# Patient Record
Sex: MALE | HISPANIC OR LATINO | URBAN - METROPOLITAN AREA
[De-identification: names, ages, dates, MRNs, and addresses within clinical notes are randomized per-mention and may not be internally consistent; named-entity substitution may affect disease eponyms.]

---

## 2024-04-01 ENCOUNTER — HOSPITAL ENCOUNTER (EMERGENCY)
Facility: HOSPITAL | Age: 34
Discharge: HOME | End: 2024-04-02
Attending: EMERGENCY MEDICINE

## 2024-04-01 DIAGNOSIS — S06.0X1A CONCUSSION WITH LOSS OF CONSCIOUSNESS OF 30 MINUTES OR LESS, INITIAL ENCOUNTER: Primary | ICD-10-CM

## 2024-04-01 DIAGNOSIS — S16.1XXA ACUTE STRAIN OF NECK MUSCLE, INITIAL ENCOUNTER: ICD-10-CM

## 2024-04-01 DIAGNOSIS — S80.02XA CONTUSION OF LEFT KNEE, INITIAL ENCOUNTER: ICD-10-CM

## 2024-04-01 DIAGNOSIS — S00.83XA CONTUSION OF FACE, INITIAL ENCOUNTER: ICD-10-CM
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
## 2024-04-01 DIAGNOSIS — R52 PAIN: ICD-10-CM

## 2024-04-01 PROCEDURE — 96374 THER/PROPH/DIAG INJ IV PUSH: CPT

## 2024-04-01 PROCEDURE — 99285 EMERGENCY DEPT VISIT HI MDM: CPT | Mod: 25

## 2024-04-01 PROCEDURE — 96375 TX/PRO/DX INJ NEW DRUG ADDON: CPT

## 2024-04-01 RX ORDER — MORPHINE SULFATE 4 MG/ML
4 INJECTION, SOLUTION INTRAMUSCULAR; INTRAVENOUS
Status: COMPLETED | OUTPATIENT
Start: 2024-04-01 | End: 2024-04-02

## 2024-04-01 RX ORDER — ONDANSETRON HYDROCHLORIDE 2 MG/ML
4 INJECTION, SOLUTION INTRAVENOUS
Status: COMPLETED | OUTPATIENT
Start: 2024-04-01 | End: 2024-04-02

## 2024-04-01 NOTE — Clinical Note
"Rodriguez Liavery Norris was seen and treated in our emergency department on 4/1/2024.  He may return to work on 04/06/2024.       If you have any questions or concerns, please don't hesitate to call.      Shauna Gonzalez, DO"

## 2024-04-02 VITALS
TEMPERATURE: 98 F | SYSTOLIC BLOOD PRESSURE: 110 MMHG | DIASTOLIC BLOOD PRESSURE: 67 MMHG | WEIGHT: 140 LBS | OXYGEN SATURATION: 100 % | HEART RATE: 54 BPM | RESPIRATION RATE: 15 BRPM

## 2024-04-02 LAB
ALBUMIN SERPL BCP-MCNC: 4.3 G/DL (ref 3.5–5.2)
ALP SERPL-CCNC: 84 U/L (ref 55–135)
ALT SERPL W/O P-5'-P-CCNC: 26 U/L (ref 10–44)
ANION GAP SERPL CALC-SCNC: 12 MMOL/L (ref 8–16)
AST SERPL-CCNC: 41 U/L (ref 10–40)
BASOPHILS # BLD AUTO: 0.04 K/UL (ref 0–0.2)
BASOPHILS NFR BLD: 0.4 % (ref 0–1.9)
BILIRUB SERPL-MCNC: 0.4 MG/DL (ref 0.1–1)
BUN SERPL-MCNC: 25 MG/DL (ref 6–20)
CALCIUM SERPL-MCNC: 9.4 MG/DL (ref 8.7–10.5)
CHLORIDE SERPL-SCNC: 101 MMOL/L (ref 95–110)
CO2 SERPL-SCNC: 21 MMOL/L (ref 23–29)
CREAT SERPL-MCNC: 1.1 MG/DL (ref 0.5–1.4)
DIFFERENTIAL METHOD BLD: ABNORMAL
EOSINOPHIL # BLD AUTO: 0.1 K/UL (ref 0–0.5)
EOSINOPHIL NFR BLD: 0.5 % (ref 0–8)
ERYTHROCYTE [DISTWIDTH] IN BLOOD BY AUTOMATED COUNT: 11.9 % (ref 11.5–14.5)
EST. GFR  (NO RACE VARIABLE): >60 ML/MIN/1.73 M^2
GLUCOSE SERPL-MCNC: 88 MG/DL (ref 70–110)
HCT VFR BLD AUTO: 42.4 % (ref 40–54)
HCV AB SERPL QL IA: NORMAL
HGB BLD-MCNC: 15.3 G/DL (ref 14–18)
HIV 1+2 AB+HIV1 P24 AG SERPL QL IA: NORMAL
IMM GRANULOCYTES # BLD AUTO: 0.04 K/UL (ref 0–0.04)
IMM GRANULOCYTES NFR BLD AUTO: 0.4 % (ref 0–0.5)
LYMPHOCYTES # BLD AUTO: 2.2 K/UL (ref 1–4.8)
LYMPHOCYTES NFR BLD: 20 % (ref 18–48)
MCH RBC QN AUTO: 31.5 PG (ref 27–31)
MCHC RBC AUTO-ENTMCNC: 36.1 G/DL (ref 32–36)
MCV RBC AUTO: 87 FL (ref 82–98)
MONOCYTES # BLD AUTO: 0.6 K/UL (ref 0.3–1)
MONOCYTES NFR BLD: 5.7 % (ref 4–15)
NEUTROPHILS # BLD AUTO: 8.2 K/UL (ref 1.8–7.7)
NEUTROPHILS NFR BLD: 73 % (ref 38–73)
NRBC BLD-RTO: 0 /100 WBC
OHS QRS DURATION: 86 MS
OHS QTC CALCULATION: 440 MS
PLATELET # BLD AUTO: 215 K/UL (ref 150–450)
PMV BLD AUTO: 11.3 FL (ref 9.2–12.9)
POTASSIUM SERPL-SCNC: 3.3 MMOL/L (ref 3.5–5.1)
PROT SERPL-MCNC: 7.9 G/DL (ref 6–8.4)
RBC # BLD AUTO: 4.85 M/UL (ref 4.6–6.2)
SODIUM SERPL-SCNC: 134 MMOL/L (ref 136–145)
WBC # BLD AUTO: 11.19 K/UL (ref 3.9–12.7)

## 2024-04-02 PROCEDURE — 25000003 PHARM REV CODE 250

## 2024-04-02 PROCEDURE — 80053 COMPREHEN METABOLIC PANEL: CPT | Performed by: EMERGENCY MEDICINE

## 2024-04-02 PROCEDURE — 25500020 PHARM REV CODE 255: Performed by: EMERGENCY MEDICINE

## 2024-04-02 PROCEDURE — 87389 HIV-1 AG W/HIV-1&-2 AB AG IA: CPT | Performed by: PHYSICIAN ASSISTANT

## 2024-04-02 PROCEDURE — 86803 HEPATITIS C AB TEST: CPT | Performed by: PHYSICIAN ASSISTANT

## 2024-04-02 PROCEDURE — 63600175 PHARM REV CODE 636 W HCPCS

## 2024-04-02 PROCEDURE — 85025 COMPLETE CBC W/AUTO DIFF WBC: CPT | Performed by: EMERGENCY MEDICINE

## 2024-04-02 RX ORDER — DIPHENHYDRAMINE HCL 25 MG
25 CAPSULE ORAL
Status: COMPLETED | OUTPATIENT
Start: 2024-04-02 | End: 2024-04-02

## 2024-04-02 RX ORDER — HYDROCODONE BITARTRATE AND ACETAMINOPHEN 5; 325 MG/1; MG/1
1 TABLET ORAL EVERY 6 HOURS PRN
Qty: 12 TABLET | Refills: 0 | Status: SHIPPED | OUTPATIENT
Start: 2024-04-02 | End: 2024-04-05

## 2024-04-02 RX ADMIN — ONDANSETRON 4 MG: 2 INJECTION INTRAMUSCULAR; INTRAVENOUS at 12:04

## 2024-04-02 RX ADMIN — IOHEXOL 75 ML: 350 INJECTION, SOLUTION INTRAVENOUS at 02:04

## 2024-04-02 RX ADMIN — MORPHINE SULFATE 4 MG: 4 INJECTION INTRAVENOUS at 12:04

## 2024-04-02 RX ADMIN — DIPHENHYDRAMINE HYDROCHLORIDE 25 MG: 25 CAPSULE ORAL at 03:04

## 2024-04-02 NOTE — ED TRIAGE NOTES
Patient reports that he fell off of the roof of a 2 story house. States that he hit the front of her face, states that he is having all over body pain from fall. Reports that he lost consciousness for a moment. Denies any blood thinners.

## 2024-04-02 NOTE — ED TRIAGE NOTES
"Rodriguez Juve NievesKevinAraujo, a 33 y.o. male presents to the ED w/ complaint of fall. Reports falling off of a roof, +LOC, bleeding from nose. Ambulatory    Triage note:  Chief Complaint   Patient presents with    Fall     On a roof and fell off 2 story house at 8am. +LOC. C/o "whole body" pain and headache. Reports was bleeding from nose. Ambulatory in triage     Review of patient's allergies indicates:  No Known Allergies  History reviewed. No pertinent past medical history.    "

## 2024-04-02 NOTE — ED PROVIDER NOTES
"Encounter Date: 4/1/2024       History     Chief Complaint   Patient presents with    Fall     On a roof and fell off 2 story house at 8am. +LOC. C/o "whole body" pain and headache. Reports was bleeding from nose. Ambulatory in triage     HPI    Patient is a 34yo male with no pmhx presenting after a 2 story fall from roof occurring at around 2000 this evening. Patient was on his way down the ladder/stairwell when he slipped. LOC for around 10 minutes. Notes epistaxis, frontal bone pain, and LLE pain. Was able to ambulate prior to arrival.      used.    Review of patient's allergies indicates:  No Known Allergies  History reviewed. No pertinent past medical history.  History reviewed. No pertinent surgical history.  History reviewed. No pertinent family history.     Physical Exam     Initial Vitals [04/01/24 2220]   BP Pulse Resp Temp SpO2   133/79 63 15 97.7 °F (36.5 °C) 97 %      MAP       --         Physical Exam    Constitutional: He appears well-developed and well-nourished. He is not diaphoretic.   HENT:   Head: Normocephalic.   Mouth/Throat: Oropharynx is clear and moist.   Eyes: Conjunctivae are normal.   Neck:   Midline cspine tenderess   Cardiovascular:  Normal rate and regular rhythm.           Pulmonary/Chest: Breath sounds normal. No respiratory distress.   Abdominal: Abdomen is soft. He exhibits no distension. There is no abdominal tenderness.   Musculoskeletal:         General: Tenderness (BL shoulders and L proximal lower leg pain) present. No edema. Normal range of motion.      Comments: Thoracic spine midline tendnerness     Neurological: He is alert. He has normal strength. No cranial nerve deficit or sensory deficit.   Skin: Skin is warm and dry.         ED Course   Procedures  Labs Reviewed   CBC W/ AUTO DIFFERENTIAL - Abnormal; Notable for the following components:       Result Value    MCH 31.5 (*)     MCHC 36.1 (*)     Gran # (ANC) 8.2 (*)     All other components within " normal limits   COMPREHENSIVE METABOLIC PANEL - Abnormal; Notable for the following components:    Sodium 134 (*)     Potassium 3.3 (*)     CO2 21 (*)     BUN 25 (*)     AST 41 (*)     All other components within normal limits   HIV 1 / 2 ANTIBODY    Narrative:     Release to patient->Immediate   HEPATITIS C ANTIBODY    Narrative:     Release to patient->Immediate     EKG Readings: (Independently Interpreted)   Initial Reading: No STEMI. Rhythm: Normal Sinus Rhythm. Heart Rate: 73. Ectopy: No Ectopy. Conduction: Normal. ST Segments: Normal ST Segments. T Waves Flipped: AVR and V1. Axis: Normal. Clinical Impression: Normal Sinus Rhythm       Imaging Results              CT Chest Abdomen Pelvis With IV Contrast (XPD) NO Oral Contrast (Final result)  Result time 04/02/24 02:27:42      Final result by Akshat Montaño MD (04/02/24 02:27:42)                   Impression:      No acute abnormality identified.    Motion and artifact limited study.    Other findings discussed in the body of the report.      Electronically signed by: Akshat Montaño MD  Date:    04/02/2024  Time:    02:27               Narrative:    EXAMINATION:  CT CHEST ABDOMEN PELVIS WITH IV CONTRAST (XPD)    CLINICAL HISTORY:  Polytrauma, blunt;    TECHNIQUE:  Low dose axial images, sagittal and coronal reformations were obtained from the thoracic inlet to the pubic symphysis following the IV administration of 75 mL of Omnipaque 350 .  Oral contrast was not given.    COMPARISON:  None.    FINDINGS:  Evaluation is limited by extensive streak artifact due to the patient's left arm overlying the field of view.  Exam quality also limited by motion.    Chest:    Base of the neck is negative for acute finding.    Thoracic aorta is normal in course and caliber without evidence of aneurysm or dissection.  No central pulmonary embolus.  Heart size is normal.  No pericardial effusion.    Minimal patchy lucency throughout the lungs, potentially related to mild  pulmonary emphysema.  Detailed evaluation of the pulmonary parenchyma limited by extensive respiratory motion.  No consolidation.  No pleural fluid.  No distinct pneumothorax.    No bulky mediastinal lymphadenopathy.    Abdomen:    Liver is unremarkable allowing for artifact limitations.  Gallbladder is unremarkable.  No intrahepatic biliary ductal dilatation.    Spleen is not enlarged.  Adrenal glands and pancreas are unremarkable.    The kidneys are symmetric.  No hydronephrosis.    No small bowel obstruction.  Appendix is normal.  Stomach is minimally distended with fluid and debris suggestive of recent ingestion.  No pneumoperitoneum, hemoperitoneum, or free fluid.    No bulky retroperitoneal lymphadenopathy.    Abdominal aorta is normal in caliber without significant atherosclerosis.    Portal vein is patent.  No portal venous gas.    Pelvis:    Urinary bladder, pelvic organs, and rectum are unremarkable.  No significant pelvic free fluid.    Bones and soft tissues:    No aggressive osseous lesions.  No acute fracture identified.  Extraperitoneal soft tissues are unremarkable, allowing for paucity of subcutaneous fat.                                       CT Head Without Contrast (Final result)  Result time 04/02/24 02:39:34      Final result by Katie Musa MD (04/02/24 02:39:34)                   Impression:      1. No CT evidence of acute intracranial abnormality. Clinical correlation and further evaluation as warranted.  2. No acute maxillofacial fracture.  Right ethmoid and maxillary sinus disease.      Electronically signed by: Katie Musa MD  Date:    04/02/2024  Time:    02:39               Narrative:    EXAMINATION:  CT HEAD WITHOUT CONTRAST    CLINICAL HISTORY:  Head trauma, moderate-severe;    TECHNIQUE:  Low dose axial images were obtained through the head and maxillofacial region.  Coronal and sagittal reformations were also performed. Contrast was not  administered.    COMPARISON:  None.    FINDINGS:  There is no acute intracranial hemorrhage, hydrocephalus, midline shift or mass effect. Gray-white matter differentiation appears maintained. The basal cisterns are patent. The mastoid air cells are essentially clear.  The visualized bones of the calvarium demonstrate no acute osseous abnormality.    There is no fracture of the maxillofacial region.  Specifically, the orbital walls, paranasal sinus walls, nasal bones, zygomatic arches, pterygoid plates, maxilla, and mandible are intact. The mandibular condyles are normal in location.  There is mucosal thickening of the ethmoid air cells.  There is mucosal thickening of the right maxillary sinus.  The globes are intact and symmetric bilaterally with normal-appearing vitreal attenuation and lenses.  Intraorbital fat attenuation is appropriately maintained.                                       CT Maxillofacial Without Contrast (Final result)  Result time 04/02/24 02:39:47      Final result by Katie Musa MD (04/02/24 02:39:47)                   Impression:      1. No CT evidence of acute intracranial abnormality. Clinical correlation and further evaluation as warranted.  2. No acute maxillofacial fracture.  Right ethmoid and maxillary sinus disease.      Electronically signed by: Katie Musa MD  Date:    04/02/2024  Time:    02:39               Narrative:    EXAMINATION:  CT MAXILLOFACIAL WITHOUT CONTRAST    CLINICAL HISTORY:  Facial trauma, blunt;    TECHNIQUE:  Low dose axial images were obtained through the head and maxillofacial region.  Coronal and sagittal reformations were also performed. Contrast was not administered.    COMPARISON:  None.    FINDINGS:  There is no acute intracranial hemorrhage, hydrocephalus, midline shift or mass effect. Gray-white matter differentiation appears maintained. The basal cisterns are patent. The mastoid air cells are essentially clear.  The visualized bones of the calvarium  demonstrate no acute osseous abnormality.    There is no fracture of the maxillofacial region.  Specifically, the orbital walls, paranasal sinus walls, nasal bones, zygomatic arches, pterygoid plates, maxilla, and mandible are intact. The mandibular condyles are normal in location. There is mucosal thickening of the ethmoid air cells. There is mucosal thickening of the right maxillary sinus. The globes are intact and symmetric bilaterally with normal-appearing vitreal attenuation and lenses. Intraorbital fat attenuation is appropriately maintained.                                       CT Cervical Spine Without Contrast (Final result)  Result time 04/02/24 02:35:02      Final result by Katie Musa MD (04/02/24 02:35:02)                   Impression:      No CT evidence of acute cervical spine fracture or traumatic subluxation.  Clinical correlation and further assessment as warranted.      Electronically signed by: Katie Musa MD  Date:    04/02/2024  Time:    02:35               Narrative:    EXAMINATION:  CT CERVICAL SPINE WITHOUT CONTRAST    CLINICAL HISTORY:  Polytrauma, blunt;    TECHNIQUE:  Low dose axial images, sagittal and coronal reformations were performed though the cervical spine.  Contrast was not administered.    COMPARISON:  None    FINDINGS:  There is straightening and minimal reversal of normal cervical lordosis which can be seen with patient positioning or muscle spasm.  Otherwise, cervical vertebral body alignment is within normal limits.  Vertebral body heights appear maintained.  Intervertebral disc heights appear maintained.  The facet joints articulate appropriately.  No significant prevertebral soft tissue swelling.  The visualized skull base is intact.  Soft tissue structures are within normal limits.  Visualized lung apices are free of pleural fluid or pneumothorax.  Please refer to dictated report for concomitantly performed CT chest, abdomen and pelvis for details regarding  intrathoracic structures.                                       X-Ray Knee 3 View Left (Final result)  Result time 04/02/24 01:45:45      Final result by Akshat Montaño MD (04/02/24 01:45:45)                   Impression:      No acute displaced fracture.      Electronically signed by: Akshat Montaño MD  Date:    04/02/2024  Time:    01:45               Narrative:    EXAMINATION:  XR TIBIA FIBULA 2 VIEW LEFT; XR KNEE 3 VIEW LEFT    CLINICAL HISTORY:  Unspecified fall, initial encounter    TECHNIQUE:  AP and lateral views of the left tibia and fibula were performed.  Three views of the left knee also obtained.    COMPARISON:  None.    FINDINGS:  Left knee: No acute displaced fracture.  No dislocation.  No sizeable joint effusion.  No unexpected radiopaque foreign body.    Left tibia/fibula: No acute displaced fracture.  No dislocation.  Soft tissues are symmetric.  No unexpected radiopaque foreign body.                                       X-Ray Tibia Fibula 2 View Left (Final result)  Result time 04/02/24 01:45:45      Final result by Akshat Montaño MD (04/02/24 01:45:45)                   Impression:      No acute displaced fracture.      Electronically signed by: Akshat Montaño MD  Date:    04/02/2024  Time:    01:45               Narrative:    EXAMINATION:  XR TIBIA FIBULA 2 VIEW LEFT; XR KNEE 3 VIEW LEFT    CLINICAL HISTORY:  Unspecified fall, initial encounter    TECHNIQUE:  AP and lateral views of the left tibia and fibula were performed.  Three views of the left knee also obtained.    COMPARISON:  None.    FINDINGS:  Left knee: No acute displaced fracture.  No dislocation.  No sizeable joint effusion.  No unexpected radiopaque foreign body.    Left tibia/fibula: No acute displaced fracture.  No dislocation.  Soft tissues are symmetric.  No unexpected radiopaque foreign body.                                       X-Ray Shoulder Trauma 3 View Bilateral (Final result)  Result time 04/02/24 00:47:44    Procedure changed from X-Ray Shoulder Trauma Right     Final result by Jluis Santana MD (04/02/24 00:47:44)                   Impression:      Degenerative changes in both shoulders with no evidence of acute fracture or bony destructive process.      Electronically signed by: Jluis Santana  Date:    04/02/2024  Time:    00:47               Narrative:    EXAMINATION:  XR SHOULDER TRAUMA 3 VIEW BILATERAL    CLINICAL HISTORY:  PAIN;Pain, unspecified    TECHNIQUE:  Three views of each shoulder were performed.    COMPARISON:  None    FINDINGS:  Osteoarthritic changes are noted in both shoulders with osteophyte most prevalent at the inferior aspect of the left humeral head neck junction.  No fracture or dislocation.  Adjacent chest wall intact.                                       Medications   morphine injection 4 mg (4 mg Intravenous Given 4/2/24 0001)   ondansetron injection 4 mg (4 mg Intravenous Given 4/2/24 0000)   iohexoL (OMNIPAQUE 350) injection 75 mL (75 mLs Intravenous Given 4/2/24 0206)   diphenhydrAMINE capsule 25 mg (25 mg Oral Given 4/2/24 0355)     Medical Decision Making  Amount and/or Complexity of Data Reviewed  Labs: ordered.  Radiology: ordered.    Risk  OTC drugs.  Prescription drug management.    Patient is a 32yo male with no pmhx presenting after a 2 story fall from roof occurring at around 2000 this evening. Fall from significant height with LOC. CT head, c-spine, and maxillofacial ordered to assess for intracranial/cervical fracture/injury. CT CAP with contrast ordered to evaluate for intrathorax injury. Xrays of BL shoulders and L knee/proximal tib also obtained and were unremarkable. No acute injury identified on imaging. Labwork also relatively unremarkable. Patient informed that he likely has a concussion and given work note for the next couple of days along with referral to the concussion management program. I informed the patient that he needed to avoid activities that would  require higher levels of activity or would require more concentration. He was given strict return precautions.                                   Clinical Impression:  Final diagnoses:  [R52] Pain  [W19.XXXA] Fall  [S06.0X1A] Concussion with loss of consciousness of 30 minutes or less, initial encounter (Primary)  [S16.1XXA] Acute strain of neck muscle, initial encounter  [S80.02XA] Contusion of left knee, initial encounter  [S00.83XA] Contusion of face, initial encounter          ED Disposition Condition    Discharge Stable          ED Prescriptions       Medication Sig Dispense Start Date End Date Auth. Provider    HYDROcodone-acetaminophen (NORCO) 5-325 mg per tablet Take 1 tablet by mouth every 6 (six) hours as needed for Pain. 12 tablet 4/2/2024 4/5/2024 Chaz Brink MD          Follow-up Information       Follow up With Specialties Details Why Contact Info    [facility] - Emergency Dept Emergency Medicine Go to  As needed, If symptoms worsen [id number] [facility] [id number] [phone]    [facility] -  Call   [address] [facility] [address]  [phone]               Shauna Gonzalez DO  Resident  04/02/24 0879

## 2024-04-08 ENCOUNTER — HOSPITAL ENCOUNTER (EMERGENCY)
Facility: HOSPITAL | Age: 34
Discharge: HOME | End: 2024-04-08
Attending: EMERGENCY MEDICINE

## 2024-04-08 VITALS
SYSTOLIC BLOOD PRESSURE: 112 MMHG | BODY MASS INDEX: 19.29 KG/M2 | OXYGEN SATURATION: 99 % | TEMPERATURE: 99 F | WEIGHT: 120 LBS | DIASTOLIC BLOOD PRESSURE: 69 MMHG | HEART RATE: 55 BPM | HEIGHT: 66 IN | RESPIRATION RATE: 18 BRPM

## 2024-04-08 DIAGNOSIS — S09.90XD TRAUMATIC INJURY OF HEAD, SUBSEQUENT ENCOUNTER: ICD-10-CM

## 2024-04-08 DIAGNOSIS — S06.0X0A CONCUSSION WITHOUT LOSS OF CONSCIOUSNESS, INITIAL ENCOUNTER: Primary | ICD-10-CM

## 2024-04-08 PROCEDURE — 96361 HYDRATE IV INFUSION ADD-ON: CPT

## 2024-04-08 PROCEDURE — 63600175 PHARM REV CODE 636 W HCPCS: Performed by: PHYSICIAN ASSISTANT

## 2024-04-08 PROCEDURE — 25000003 PHARM REV CODE 250: Performed by: PHYSICIAN ASSISTANT

## 2024-04-08 PROCEDURE — 96374 THER/PROPH/DIAG INJ IV PUSH: CPT

## 2024-04-08 PROCEDURE — 99285 EMERGENCY DEPT VISIT HI MDM: CPT | Mod: 25

## 2024-04-08 RX ORDER — ACETAMINOPHEN 500 MG
1000 TABLET ORAL
Status: COMPLETED | OUTPATIENT
Start: 2024-04-08 | End: 2024-04-08

## 2024-04-08 RX ORDER — ONDANSETRON 4 MG/1
4 TABLET, ORALLY DISINTEGRATING ORAL EVERY 6 HOURS PRN
Qty: 12 TABLET | Refills: 0 | Status: SHIPPED | OUTPATIENT
Start: 2024-04-08

## 2024-04-08 RX ORDER — METOCLOPRAMIDE HYDROCHLORIDE 5 MG/ML
10 INJECTION INTRAMUSCULAR; INTRAVENOUS
Status: COMPLETED | OUTPATIENT
Start: 2024-04-08 | End: 2024-04-08

## 2024-04-08 RX ADMIN — ACETAMINOPHEN 1000 MG: 500 TABLET ORAL at 06:04

## 2024-04-08 RX ADMIN — SODIUM CHLORIDE 1000 ML: 9 INJECTION, SOLUTION INTRAVENOUS at 06:04

## 2024-04-08 RX ADMIN — METOCLOPRAMIDE 10 MG: 5 INJECTION, SOLUTION INTRAMUSCULAR; INTRAVENOUS at 06:04

## 2024-04-08 NOTE — DISCHARGE INSTRUCTIONS
Your CT of your head does not show any intracerebral bleeding.  No skull fractures.  Your symptoms are from a concussion after traumatic brain injury.  Rest.  Avoid deep concentration.  Take breaks throughout the day.  Follow up closely with Neurology or primary care physician for re-evaluation.  Activity as tolerated.  Stay hydrated by drinking plenty of fluids.  It may take weeks to months for your symptoms to improve but will get better over time.  Take ibuprofen 600-800 mg every 6 hours as needed with food for anti-inflammatory relief.  You can take acetaminophen/tylenol 650 mg every 6 hours or 1000 mg every 8 hours for added relief.  Take zofran every 6 hours as needed for your nausea.   Apply ice to the area for 10-20 minutes every 4 hours. You can apply heat 2 days after for the same duration and frequency.  Return to the ER for new or worsening symptoms.  No future appointments.  Imaging Results              CT Head Without Contrast (Final result)  Result time 04/08/24 17:54:32      Final result by Jasvir Musa MD (04/08/24 17:54:32)                   Impression:      No CT evidence of acute intracranial abnormality. Clinical correlation and further evaluation as warranted.      Electronically signed by: Jasvir Musa MD  Date:    04/08/2024  Time:    17:54               Narrative:    EXAMINATION:  CT HEAD WITHOUT CONTRAST    CLINICAL HISTORY:  Headache, chronic, new features or increased frequency;    TECHNIQUE:  Low dose axial images were obtained through the head.  Coronal and sagittal reformations were also performed. Contrast was not administered.    COMPARISON:  CT head 04/02/2020    FINDINGS:  There is no acute intracranial hemorrhage, hydrocephalus, midline shift or mass effect. Gray-white matter differentiation appears maintained. The basal cisterns are patent. There is partially visualized mucosal thickening of the right maxillary sinus.  The remaining paranasal sinuses mastoid air cells  appear well aerated.  No displaced calvarial fracture identified.

## 2024-04-08 NOTE — ED PROVIDER NOTES
"Encounter Date: 4/8/2024       History     Chief Complaint   Patient presents with    Headache     Fell thru roof 8 d ago, having headaches, vomited little blood     4:53 PM  [employer] interpretation services used    Patient is a 33-year-old Trinidadian-speaking male who presents to [facility] ED via POV with his family member for emergent evaluation of persistent headache since head trauma.    Patient had a fall on 04/01/2024, approximately 7 days ago from the 2nd level of the house.  He was told that he hit his forehead.  He was seen in the ED and pan CT scans were negative.  He states since his fall he has had a "severe" headache that has not resolved.  He has nausea and vomiting with scant blood sometimes when he wakes up in the morning.  His pain is located to his posterior and frontal head.  Describes it as annoying.  He has eye pain but denies any blurred vision or diplopia.  His pain is worse with standing up and certain movements.  Nothing improves his pain.  He has had acetaminophen and ibuprofen as prescribed by previous ED visit with without resolution.  Currently his pain is 8/10.  Last dose of medication yesterday.  He has not had any difficulty speaking or ambulating at his baseline. Denies any daily medication, n maurice pain, chest pain, abdominal pain, blood in stool, or syncope.              Review of patient's allergies indicates:  No Known Allergies  No past medical history on file.  No past surgical history on file.  No family history on file.     Review of Systems   Constitutional:  Negative for activity change, appetite change, chills, diaphoresis and fever.   HENT:  Negative for sore throat.    Eyes:  Positive for pain. Negative for photophobia, discharge, redness, itching and visual disturbance.   Respiratory:  Negative for shortness of breath.    Cardiovascular:  Negative for chest pain.   Gastrointestinal:  Positive for nausea and vomiting. Negative for abdominal pain, diarrhea and rectal pain. "   Genitourinary:  Negative for dysuria.   Musculoskeletal:  Negative for back pain, gait problem and neck pain.   Skin:  Negative for rash.   Neurological:  Positive for headaches. Negative for weakness.   Hematological:  Does not bruise/bleed easily.       Physical Exam     Initial Vitals [04/08/24 1411]   BP Pulse Resp Temp SpO2   139/73 66 18 97.4 °F (36.3 °C) 98 %      MAP       --         Physical Exam    Vitals reviewed.  Constitutional: He appears well-developed and well-nourished. He is not diaphoretic. He is cooperative.  Non-toxic appearance. He does not have a sickly appearance. He does not appear ill. No distress.   HENT:   Head: Normocephalic and atraumatic. Head is without raccoon's eyes, without Abarca's sign, without abrasion, without contusion and without laceration. Hair is normal.   Right Ear: External ear normal. Tympanic membrane is not erythematous, not retracted and not bulging. No hemotympanum.   Left Ear: External ear normal. Tympanic membrane is not erythematous, not retracted and not bulging. No hemotympanum.   Nose: Nose normal. No rhinorrhea or nasal deformity. No epistaxis.   Mouth/Throat: No trismus in the jaw.   Eyes: Conjunctivae and EOM are normal.   Neck:   Normal range of motion.  Pulmonary/Chest: No accessory muscle usage. No tachypnea. No respiratory distress.   Abdominal: He exhibits no distension.   Musculoskeletal:         General: Normal range of motion.      Cervical back: Normal range of motion. No erythema or rigidity. No spinous process tenderness. Normal range of motion.      Thoracic back: No bony tenderness. Normal range of motion.      Lumbar back: No bony tenderness. Normal range of motion.     Neurological: He is alert. He has normal strength.   Skin: Skin is dry. No pallor.         ED Course   Procedures  Labs Reviewed - No data to display       Imaging Results              CT Head Without Contrast (Final result)  Result time 04/08/24 17:54:32      Final result by  Jasvir Musa MD (04/08/24 17:54:32)                   Impression:      No CT evidence of acute intracranial abnormality. Clinical correlation and further evaluation as warranted.      Electronically signed by: Jasvir Musa MD  Date:    04/08/2024  Time:    17:54               Narrative:    EXAMINATION:  CT HEAD WITHOUT CONTRAST    CLINICAL HISTORY:  Headache, chronic, new features or increased frequency;    TECHNIQUE:  Low dose axial images were obtained through the head.  Coronal and sagittal reformations were also performed. Contrast was not administered.    COMPARISON:  CT head 04/02/2020    FINDINGS:  There is no acute intracranial hemorrhage, hydrocephalus, midline shift or mass effect. Gray-white matter differentiation appears maintained. The basal cisterns are patent. There is partially visualized mucosal thickening of the right maxillary sinus.  The remaining paranasal sinuses mastoid air cells appear well aerated.  No displaced calvarial fracture identified.                                       Medications   sodium chloride 0.9% bolus 1,000 mL 1,000 mL (1,000 mLs Intravenous New Bag 4/8/24 1806)   acetaminophen tablet 1,000 mg (1,000 mg Oral Given 4/8/24 1805)   metoclopramide injection 10 mg (10 mg Intravenous Given 4/8/24 1806)     Medical Decision Making  Patient is a 33-year-old [place]-speaking male who presents to [facility] ED via POV with his family member for emergent evaluation of persistent headache since head trauma.    Differential diagnosis includes but isn't limited to intracerebral abnormality such as hemorrhage, hematoma, concussion, strain, sprain, DJD, DDD.  Head atraumatic.  C, T, L-spine without bony tenderness or step-offs.  Full range of motion and strength throughout.  No difficulty bearing weight or ambulating.  Chest wall and abdomen soft, nontender nondistended.  Do not think he has a spinal fracture or intrathoracic or abdominal pathology.    Since he has had a persistent  headache which he describes as severe and annoying since head trauma with eye pain and nausea and vomiting, I will obtain CT head today.  Will give IV fluids and medicine for symptomatic relief.  If negative, will treat for concussion.    Amount and/or Complexity of Data Reviewed  External Data Reviewed: radiology and notes.  Radiology: ordered. Decision-making details documented in ED Course.    Risk  OTC drugs.  Prescription drug management.               ED Course as of 04/08/24 1814 Mon Apr 08, 2024   1646 BP: 139/73 [CL]   1647 Temp: 97.4 °F (36.3 °C) [CL]   1647 Pulse: 66 [CL]   1647 Resp: 18 [CL]   1647 SpO2: 98 % [CL]   1802 CT Head Without Contrast  No CT evidence of acute intracranial abnormality. Clinical correlation and further evaluation as warranted. [CL]   1812 [employer] interpretation services used for discharging.      He was updated with results.  Will treat for concussion.  Discussed pathophysiology.  We discussed an array of symptoms and what he may expect.  Activity as tolerated.  Avoid deep concentration.  Take several breaks at work.  Rest.  Stay hydrated.  Follow up closely with Neurology and PCP for re-evaluation.  Continue OTC medication.  Zofran as needed for nausea.  All of his questions were answered.  Patient comfortable with plan and stable for discharge. [CL]      ED Course User Index  [CL] Lori Moraes PA-C                           Clinical Impression:  Final diagnoses:  [S09.90XD] Traumatic injury of head, subsequent encounter  [S06.0X0A] Concussion without loss of consciousness, initial encounter (Primary)          ED Disposition Condition    Discharge Stable          ED Prescriptions       Medication Sig Dispense Start Date End Date Auth. Provider    ondansetron (ZOFRAN-ODT) 4 MG TbDL Take 1 tablet (4 mg total) by mouth every 6 (six) hours as needed (nausea). 12 tablet 4/8/2024 -- Lori Moraes PA-C          Follow-up Information       Follow up With Specialties Details Why  Contact Info Additional Information    [facility] - Neurology 7th Fl Neurology Schedule an appointment as soon as possible for a visit   [address]  [facility] [address] [phone] [facility] - Main Building, 7th Floor Please park in [facility] and take Clinic elevator    [facility]  Schedule an appointment as soon as possible for a visit   [address] [place] [address], [address]  [place] [address]  [phone]     [facility]  Schedule an appointment as soon as possible for a visit   Free clinic in [place], [place]  Open every Tuesday and Thursday evening from 5:00pm to 8:00pm.    Please call us Monday-Friday between 8 am and 4 pm.  [address] [place] [address]  Phone: [phone]     [facility] - Emergency Dept Emergency Medicine  If symptoms worsen [address]  [facility] [id number] [phone]              Lori Moraes PA-C  04/08/24 1814

## 2024-04-08 NOTE — Clinical Note
"Rodriguez Simon"Myrna was seen and treated in our emergency department on 4/8/2024.  He may return to work on 04/09/2024.  LIGHT DUTY FOR 2 WEEKS. TAKE BREAKS THROUGHOUT THE DAY.     If you have any questions or concerns, please don't hesitate to call.      Lori Moraes PA-C"

## 2024-04-08 NOTE — ED TRIAGE NOTES
Patient comes into the emergency department by POV with complaints of HA. Patient states that he fell through a roof 8 days ago and has been vomiting blood.

## 2024-04-08 NOTE — FIRST PROVIDER EVALUATION
Medical screening examination initiated.  I have conducted a focused provider triage encounter, findings are as follows:    Brief history of present illness:  8 days of a headache. Seen on 4/2 after a fall. CT pan scan negative    Fell from 2nd floor. Woke up today and vomited blood. Reports small amount of blood, not large amount    Vitals:    04/08/24 1411   BP: 139/73   Pulse: 66   Resp: 18   Temp: 97.4 °F (36.3 °C)   TempSrc: Oral   SpO2: 98%       Pertinent physical exam:  no focal neuro deficits    Brief workup plan:  deferred    Preliminary workup initiated; this workup will be continued and followed by the physician or advanced practice provider that is assigned to the patient when roomed.

## 2024-05-16 ENCOUNTER — HOSPITAL ENCOUNTER (EMERGENCY)
Facility: HOSPITAL | Age: 34
Discharge: HOME | End: 2024-05-16
Attending: EMERGENCY MEDICINE

## 2024-05-16 VITALS
RESPIRATION RATE: 16 BRPM | TEMPERATURE: 99 F | HEART RATE: 58 BPM | OXYGEN SATURATION: 98 % | HEIGHT: 67 IN | BODY MASS INDEX: 20.4 KG/M2 | SYSTOLIC BLOOD PRESSURE: 124 MMHG | DIASTOLIC BLOOD PRESSURE: 79 MMHG | WEIGHT: 130 LBS

## 2024-05-16 DIAGNOSIS — S51.812A LACERATION OF LEFT FOREARM, INITIAL ENCOUNTER: Primary | ICD-10-CM

## 2024-05-16 PROCEDURE — 90471 IMMUNIZATION ADMIN: CPT | Performed by: NURSE PRACTITIONER

## 2024-05-16 PROCEDURE — 99283 EMERGENCY DEPT VISIT LOW MDM: CPT | Mod: 25

## 2024-05-16 PROCEDURE — 90715 TDAP VACCINE 7 YRS/> IM: CPT | Performed by: NURSE PRACTITIONER

## 2024-05-16 PROCEDURE — 25000003 PHARM REV CODE 250: Performed by: EMERGENCY MEDICINE

## 2024-05-16 PROCEDURE — 63600175 PHARM REV CODE 636 W HCPCS: Performed by: NURSE PRACTITIONER

## 2024-05-16 RX ORDER — LIDOCAINE HYDROCHLORIDE 10 MG/ML
5 INJECTION, SOLUTION EPIDURAL; INFILTRATION; INTRACAUDAL; PERINEURAL
Status: COMPLETED | OUTPATIENT
Start: 2024-05-16 | End: 2024-05-16

## 2024-05-16 RX ORDER — LIDOCAINE HYDROCHLORIDE 20 MG/ML
INJECTION INTRAVENOUS
Status: DISCONTINUED
Start: 2024-05-16 | End: 2024-05-16 | Stop reason: WASHOUT

## 2024-05-16 RX ADMIN — LIDOCAINE HYDROCHLORIDE 50 MG: 10 INJECTION, SOLUTION EPIDURAL; INFILTRATION; INTRACAUDAL; PERINEURAL at 11:05

## 2024-05-16 RX ADMIN — CLOSTRIDIUM TETANI TOXOID ANTIGEN (FORMALDEHYDE INACTIVATED), CORYNEBACTERIUM DIPHTHERIAE TOXOID ANTIGEN (FORMALDEHYDE INACTIVATED), BORDETELLA PERTUSSIS TOXOID ANTIGEN (GLUTARALDEHYDE INACTIVATED), BORDETELLA PERTUSSIS FILAMENTOUS HEMAGGLUTININ ANTIGEN (FORMALDEHYDE INACTIVATED), BORDETELLA PERTUSSIS PERTACTIN ANTIGEN, AND BORDETELLA PERTUSSIS FIMBRIAE 2/3 ANTIGEN 0.5 ML: 5; 2; 2.5; 5; 3; 5 INJECTION, SUSPENSION INTRAMUSCULAR at 09:05

## 2024-05-16 NOTE — FIRST PROVIDER EVALUATION
Medical screening examination initiated.  I have conducted a focused provider triage encounter, findings are as follows:    Brief history of present illness:  Left forearm laceration from piece of glass. Tetanus not UTD.     There were no vitals filed for this visit.    Pertinent physical exam:  Wound wrapped. Full ROM of all digits of hand, wrist, elbow    Brief workup plan:  FA xray, TDAP    Preliminary workup initiated; this workup will be continued and followed by the physician or advanced practice provider that is assigned to the patient when roomed.

## 2024-05-16 NOTE — DISCHARGE INSTRUCTIONS
Ice pack to the arm today.  Tylenol ibuprofen for pain.  Watch for any signs of infection including redness swelling pus drainage or red streak going up the arm.  Sutures to be removed in 10 days.  Return as needed.  Tetanus precautions

## 2024-05-16 NOTE — ED PROVIDER NOTES
Encounter Date: 5/16/2024       History     Chief Complaint   Patient presents with    Laceration     LEFT FA, CARRYING GLASS AND DROPPED IT     34-year-old  male presents emergency room with complaints of having sustained a laceration to the volar aspect of his left forearm while carrying glass at work that he dropped.  No complaint of any sensory changes to the hand or fingers nor any limited range of motion to the digits.  No elbow pain.  He has had venous bleeding but no evidence of any arterial bleed.  Tetanus status is not up-to-date.      Review of patient's allergies indicates:  No Known Allergies  No past medical history on file.  No past surgical history on file.  No family history on file.     Review of Systems   Musculoskeletal:  Negative for myalgias.   Skin:  Positive for wound. Negative for color change.   Neurological:  Negative for numbness.   All other systems reviewed and are negative.      Physical Exam     Initial Vitals [05/16/24 0940]   BP Pulse Resp Temp SpO2   124/79 (!) 58 16 98.6 °F (37 °C) 98 %      MAP       --         Physical Exam    Vitals reviewed.  Constitutional: He appears well-developed and well-nourished. He is not diaphoretic. No distress.   HENT:   Head: Normocephalic and atraumatic.   Musculoskeletal:         General: Normal range of motion.     Neurological: He is alert and oriented to person, place, and time. No sensory deficit.   Skin: Skin is warm and dry. Capillary refill takes less than 2 seconds.   5 cm laceration involving skin and subcu tissue over the volar medial aspect of left forearm.  Minimal venous bleeding is appreciated.  Range of motion of the elbow hand wrist and fingers is normal.         ED Course   Procedures  Labs Reviewed - No data to display       Imaging Results              X-Ray Forearm Left (Final result)  Result time 05/16/24 10:23:01      Final result by Mila Ray MD (05/16/24 10:23:01)                   Impression:      No  acute fracture or dislocation.      Electronically signed by: Mila Ray  Date:    05/16/2024  Time:    10:23               Narrative:    CLINICAL HISTORY:  (MRN [id number] y/o  (1990) M    Laceration without foreign body of unspecified upper arm, initial encounter    TECHNIQUE:  (SURJIT# [id number], Exam time 5/16/2024 10:13)    IMG96 XR FOREARM LEFT  view(s) obtained.    COMPARISON:  None available.    FINDINGS:  No acute fracture or dislocation. The joints and interspaces appear to be maintained.  there is soft tissue laceration in the medial soft tissues.  There are no radiopaque foreign bodies.                                       Medications   Tdap vaccine injection 0.5 mL (0.5 mLs Intramuscular Given 5/16/24 0957)   LIDOcaine (PF) 10 mg/ml (1%) injection 50 mg (50 mg Infiltration Given 5/16/24 1100)     Medical Decision Making  Risk  Prescription drug management.              Attending Attestation:             Attending ED Notes:   ED course and MDM:  This 34-year-old male who sustained laceration to his level of the forearm on glass while working had an x-ray obtained which did not show any evidence of any radiopaque foreign body.  Neurovascular status of the arms intact with good peripheral pulses and sensation and full mobility.  Procedure note:  The forearm was cleansed with peroxide and Betadine and skin closure with 4-0 Ethilon in a simple interrupted and running fashion.  The patient will have his wound dressed and given a DT immunization.  He is advised to having sutures removed in 7-10 days and watch closely for any signs of infection.                             Clinical Impression:  Final diagnoses:  [S54.296L] Laceration of left forearm, initial encounter (Primary)          ED Disposition Condition    Discharge Stable          ED Prescriptions    None       Follow-up Information       [facility] With Specialties Details [facility]   In 10 days As needed [address] [place] [id number]  [phone]               Rohit Prince Jr., MD  05/16/24 1124